# Patient Record
Sex: MALE | Race: WHITE | URBAN - METROPOLITAN AREA
[De-identification: names, ages, dates, MRNs, and addresses within clinical notes are randomized per-mention and may not be internally consistent; named-entity substitution may affect disease eponyms.]

---

## 2022-09-22 ENCOUNTER — OFFICE VISIT (OUTPATIENT)
Dept: URBAN - METROPOLITAN AREA CLINIC 91 | Facility: CLINIC | Age: 42
End: 2022-09-22
Payer: OTHER GOVERNMENT

## 2022-09-22 DIAGNOSIS — H02.834 DERMATOCHALASIS OF LEFT UPPER EYELID: ICD-10-CM

## 2022-09-22 DIAGNOSIS — H02.831 DERMATOCHALASIS OF RIGHT UPPER EYELID: Primary | ICD-10-CM

## 2022-09-22 PROCEDURE — 99203 OFFICE O/P NEW LOW 30 MIN: CPT | Performed by: SPECIALIST

## 2022-09-22 ASSESSMENT — INTRAOCULAR PRESSURE
OD: 14
OS: 15

## 2022-09-22 NOTE — IMPRESSION/PLAN
Impression: Dermatochalasis of right upper eyelid: H02.831. Plan: Drooping upper eyelids were discussed with patient. The patient feels that upper lids are affecting their field of vision or daily life and would like to discuss surgery intervention. Risks, Benefits, and Alternatives explained to the patient including bleeding, scarring, asymmetric lids, bruising, loss of eye or vision, worsening of dry eye condition, poor cosmesis, and need for further surgery. Patient understands that they should be off blood thinner for 10-14 days prior to surgery. Patient agrees with all of these risks and the patient has opted to undergo surgery. After reviewing field test and external photos, dermatochalasis affects superior 40 degrees of vision and it improves with taping of the lids. Due to the fact that testing shows improvement in fields of vision,  I recommended that patient move forward with blepharoplasty Pt understands and elects to move forward with surgery. Pt to return in 2 weeks for testing and same day preop.

## 2022-10-18 ENCOUNTER — OFFICE VISIT (OUTPATIENT)
Dept: URBAN - METROPOLITAN AREA CLINIC 91 | Facility: CLINIC | Age: 42
End: 2022-10-18
Payer: OTHER GOVERNMENT

## 2022-10-18 DIAGNOSIS — H02.831 DERMATOCHALASIS OF RIGHT UPPER EYELID: Primary | ICD-10-CM

## 2022-10-18 DIAGNOSIS — H02.834 DERMATOCHALASIS OF LEFT UPPER EYELID: ICD-10-CM

## 2022-10-18 PROCEDURE — 99213 OFFICE O/P EST LOW 20 MIN: CPT | Performed by: SPECIALIST

## 2022-10-18 PROCEDURE — 92285 EXTERNAL OCULAR PHOTOGRAPHY: CPT | Performed by: SPECIALIST

## 2022-10-18 PROCEDURE — 92081 LIMITED VISUAL FIELD XM: CPT | Performed by: SPECIALIST

## 2022-10-18 RX ORDER — CEPHALEXIN 250 MG/1
250 MG CAPSULE ORAL
Qty: 28 | Refills: 0 | Status: ACTIVE
Start: 2022-10-18

## 2022-10-18 RX ORDER — NEOMYCIN SULFATE, POLYMYXIN B SULFATE AND DEXAMETHASONE 3.5; 10000; 1 MG/G; [USP'U]/G; MG/G
OINTMENT OPHTHALMIC
Qty: 3.5 | Refills: 2 | Status: ACTIVE
Start: 2022-10-18

## 2022-10-18 ASSESSMENT — INTRAOCULAR PRESSURE
OD: 14
OS: 14

## 2022-11-23 ENCOUNTER — OFFICE VISIT (OUTPATIENT)
Dept: URBAN - METROPOLITAN AREA CLINIC 91 | Facility: CLINIC | Age: 42
End: 2022-11-23
Payer: OTHER GOVERNMENT

## 2022-11-23 DIAGNOSIS — Z48.810 ENCOUNTER FOR SURGICAL AFTERCARE FOLLOWING SURGERY ON A SENSE ORGAN: Primary | ICD-10-CM

## 2022-11-23 PROCEDURE — 99024 POSTOP FOLLOW-UP VISIT: CPT | Performed by: OPHTHALMOLOGY

## 2022-11-23 NOTE — IMPRESSION/PLAN
Impression: Encounter for surgical aftercare following surgery on a sense organ: Z48.810. Plan: Patient healing well post BUL Blepharoplasty BUL, sutures removed at this time.

## 2022-12-21 ENCOUNTER — OFFICE VISIT (OUTPATIENT)
Dept: URBAN - METROPOLITAN AREA CLINIC 91 | Facility: CLINIC | Age: 42
End: 2022-12-21
Payer: OTHER GOVERNMENT

## 2022-12-21 DIAGNOSIS — Z48.810 ENCOUNTER FOR SURGICAL AFTERCARE FOLLOWING SURGERY ON A SENSE ORGAN: Primary | ICD-10-CM

## 2022-12-21 DIAGNOSIS — H25.13 AGE-RELATED NUCLEAR CATARACT, BILATERAL: ICD-10-CM

## 2022-12-21 PROCEDURE — 99024 POSTOP FOLLOW-UP VISIT: CPT | Performed by: SPECIALIST

## 2022-12-21 NOTE — IMPRESSION/PLAN
Impression: Encounter for surgical aftercare following surgery on a sense organ: Z48.810. Plan: s/p bleph OU. Incisions healing well.

## 2024-01-08 ENCOUNTER — OFFICE VISIT (OUTPATIENT)
Dept: URBAN - METROPOLITAN AREA CLINIC 91 | Facility: CLINIC | Age: 44
End: 2024-01-08
Payer: OTHER GOVERNMENT

## 2024-01-08 DIAGNOSIS — H25.13 AGE-RELATED NUCLEAR CATARACT, BILATERAL: Primary | ICD-10-CM

## 2024-01-08 PROCEDURE — 99214 OFFICE O/P EST MOD 30 MIN: CPT | Performed by: SPECIALIST

## 2024-01-08 ASSESSMENT — INTRAOCULAR PRESSURE
OS: 12
OD: 12